# Patient Record
Sex: MALE | Race: WHITE | NOT HISPANIC OR LATINO | Employment: FULL TIME | URBAN - METROPOLITAN AREA
[De-identification: names, ages, dates, MRNs, and addresses within clinical notes are randomized per-mention and may not be internally consistent; named-entity substitution may affect disease eponyms.]

---

## 2023-10-27 ENCOUNTER — HOSPITAL ENCOUNTER (EMERGENCY)
Facility: HOSPITAL | Age: 18
Discharge: HOME | End: 2023-10-27
Attending: EMERGENCY MEDICINE
Payer: COMMERCIAL

## 2023-10-27 ENCOUNTER — APPOINTMENT (OUTPATIENT)
Dept: GENERAL RADIOLOGY | Facility: HOSPITAL | Age: 18
End: 2023-10-27
Payer: COMMERCIAL

## 2023-10-27 VITALS
HEART RATE: 98 BPM | TEMPERATURE: 98.3 F | WEIGHT: 175 LBS | OXYGEN SATURATION: 98 % | SYSTOLIC BLOOD PRESSURE: 164 MMHG | RESPIRATION RATE: 18 BRPM | DIASTOLIC BLOOD PRESSURE: 87 MMHG | BODY MASS INDEX: 25.92 KG/M2 | HEIGHT: 69 IN

## 2023-10-27 DIAGNOSIS — S61.419A LACERATION OF DORSUM OF HAND: Primary | ICD-10-CM

## 2023-10-27 PROCEDURE — 99282 EMERGENCY DEPT VISIT SF MDM: CPT

## 2023-10-27 PROCEDURE — 73130 X-RAY EXAM OF HAND: CPT

## 2023-10-27 RX ORDER — LIDOCAINE HYDROCHLORIDE 10 MG/ML
5 INJECTION, SOLUTION EPIDURAL; INFILTRATION; INTRACAUDAL; PERINEURAL ONCE
Status: COMPLETED | OUTPATIENT
Start: 2023-10-27 | End: 2023-10-27

## 2023-10-27 RX ORDER — DIAPER,BRIEF,INFANT-TODD,DISP
1 EACH MISCELLANEOUS ONCE
Status: COMPLETED | OUTPATIENT
Start: 2023-10-27 | End: 2023-10-27

## 2023-10-27 RX ADMIN — BACITRACIN 0.9 G: 500 OINTMENT TOPICAL at 22:01

## 2023-10-27 RX ADMIN — LIDOCAINE HYDROCHLORIDE 5 ML: 10 INJECTION, SOLUTION EPIDURAL; INFILTRATION; INTRACAUDAL; PERINEURAL at 21:35

## 2023-10-27 NOTE — Clinical Note
[facility]  [address]  [facility] [address]    Spencer Goldberg was seen and treated in our emergency department on 10/27/2023.  He may return to work on 10/30/2023.         Thank you for choosing [facility].    Chad Dominguez MD

## 2023-10-27 NOTE — Clinical Note
[facility]  [address]  [facility] [address]    Spencer Goldberg was seen and treated in our emergency department on 10/27/2023.  He may return to work on 10/30/2023.         Thank you for choosing [facility].    Chad Dominguez MD

## 2023-10-27 NOTE — Clinical Note
[facility]  [address]  [facility] [address]    Spencer Goldberg was seen and treated in our emergency department on 10/27/2023.  He may return to work on 10/30/2023.         Thank you for choosing [facility].    Chad Dominguez MD

## 2023-10-28 NOTE — FSED PROVIDER NOTE
Subjective   History of Present Illness  Patient was moving a TV big screen and it slipped and he caught it smashing his hand at the metacarpals and caused a dorsal avulsion laceration that is full-thickness as a flap.  The patient also has a small laceration approximately 8 mm to his right little finger on the dorsum as well minimal bony tenderness there.      Review of Systems   Skin:  Positive for wound.   All other systems reviewed and are negative.      Past Medical History:   Diagnosis Date    ADHD (attention deficit hyperactivity disorder)        No Known Allergies    Past Surgical History:   Procedure Laterality Date    APPENDECTOMY         Family History   Problem Relation Age of Onset    No Known Problems Mother     No Known Problems Father        Social History     Socioeconomic History    Marital status: Single   Tobacco Use    Smoking status: Never    Smokeless tobacco: Never   Vaping Use    Vaping Use: Never used   Substance and Sexual Activity    Alcohol use: No    Drug use: Defer    Sexual activity: Defer           Objective   Physical Exam  Vitals and nursing note reviewed.   Constitutional:       General: He is not in acute distress.     Appearance: Normal appearance. He is not ill-appearing, toxic-appearing or diaphoretic.   HENT:      Head: Normocephalic.   Eyes:      Pupils: Pupils are equal, round, and reactive to light.   Pulmonary:      Effort: No respiratory distress.   Skin:     General: Skin is warm.      Capillary Refill: Capillary refill takes less than 2 seconds.      Findings: Lesion present.      Comments: 2-1/2 cm circular avulsion laceration that on the dorsum of his right hand with bony tenderness in the area as a  contusion that caused it.  Patient also has a small laceration partial-thickness to his fifth digit on the dorsum.  Good cap refill full range of motion mostly tender at the metacarpal third and second.   Neurological:      Mental Status: He is alert.          Laceration Repair    Date/Time: 10/27/2023 9:50 PM    Performed by: Chad Dominguez MD  Authorized by: Chad Dominguez MD    Consent:     Consent obtained:  Verbal    Consent given by:  Patient    Risks discussed:  Pain  Universal protocol:     Procedure explained and questions answered to patient or proxy's satisfaction: yes      Relevant documents present and verified: no      Test results available: no      Imaging studies available: no      Required blood products, implants, devices, and special equipment available: no      Site/side marked: no      Immediately prior to procedure, a time out was called: yes      Patient identity confirmed:  Verbally with patient  Anesthesia:     Anesthesia method:  Local infiltration    Local anesthetic:  Lidocaine 1% w/o epi  Laceration details:     Location:  Hand    Hand location:  R hand, dorsum    Length (cm):  2.5    Depth (mm):  3  Pre-procedure details:     Preparation:  Patient was prepped and draped in usual sterile fashion  Exploration:     Limited defect created (wound extended): yes      Hemostasis achieved with:  Direct pressure    Imaging obtained: x-ray      Imaging outcome: foreign body not noted      Wound exploration: wound explored through full range of motion and entire depth of wound visualized      Contaminated: no    Treatment:     Area cleansed with:  Povidone-iodine    Irrigation solution:  Sterile water    Irrigation method:  Pressure wash    Visualized foreign bodies/material removed: no      Debridement:  None    Undermining:  None    Layers/structures repaired:  Deep subcutaneous  Deep subcutaneous:     Number of sutures:  4  Skin repair:     Repair method:  Sutures    Suture size:  4-0  Repair type:     Repair type:  Simple  Post-procedure details:     Dressing:  Antibiotic ointment    Procedure completion:  Tolerated well, no immediate complications  Comments:      Second wound was sutured 9 mm same parameters 2  sutures were placed simple interrupted partial-thickness no subcutaneous 4-0 Ethilon was used as well same anesthetic 1% lidocaine without epi did not fill out the same form its the same format.             ED Course                                           Medical Decision Making  Patient has 6 sutures totally placed for in the back of his hand will 2 on the little finger both on the dorsum good approximation hemostasis patient tolerated the procedure well.  Bacitracin and bandage was applied.  X-rays are negative for any fracture of the metacarpals.    Problems Addressed:  Laceration of dorsum of hand: complicated acute illness or injury    Amount and/or Complexity of Data Reviewed  Radiology: ordered.     Details: X-rays are negative metacarpals hand no fracture dislocation    Risk  OTC drugs.  Prescription drug management.        Final diagnoses:   Laceration of dorsum of hand       ED Disposition  ED Disposition       ED Disposition   Discharge    Condition   Stable    Comment   --               Peewee Hernandez MD  [address] [place], [place] [address]  [place] [id number]  [phone]      Sutures out in 8 to 10 days, For suture removal         Medication List      No changes were made to your prescriptions during this visit.